# Patient Record
Sex: MALE | Race: BLACK OR AFRICAN AMERICAN | URBAN - METROPOLITAN AREA
[De-identification: names, ages, dates, MRNs, and addresses within clinical notes are randomized per-mention and may not be internally consistent; named-entity substitution may affect disease eponyms.]

---

## 2017-01-03 ENCOUNTER — MEDICAL CORRESPONDENCE (OUTPATIENT)
Dept: TRANSPLANT | Facility: CLINIC | Age: 62
End: 2017-01-03

## 2017-02-14 ENCOUNTER — TELEPHONE (OUTPATIENT)
Dept: TRANSPLANT | Facility: CLINIC | Age: 62
End: 2017-02-14

## 2017-02-14 NOTE — TELEPHONE ENCOUNTER
Received weight record from dialysis - weight is 278 on 12/28/2016 which equals to a BMI of 39. Pt has lost 8 pounds since he was here. Called pt today and spoke to him while he was eating potato chips. I reviewed what his weight is now and our criteria of BMI of 35 for kidney transplant with a maximum weight of 250 pounds. Pt acknowledged he knows this and has my last letter which explains this. Pt stating he is losing weight on his own and will check with his dialysis unit for weight loss program availability. I explained that at this time, no further appointments will be made for his transplant evaluation until his weight is 250 pounds. Pt expressed good understanding of all and was in good agreement with the plan.

## 2017-02-20 ENCOUNTER — TELEPHONE (OUTPATIENT)
Dept: TRANSPLANT | Facility: CLINIC | Age: 62
End: 2017-02-20

## 2017-02-20 DIAGNOSIS — N18.6 ESRD (END STAGE RENAL DISEASE) (H): Primary | ICD-10-CM

## 2017-02-20 NOTE — TELEPHONE ENCOUNTER
"Per Dr. Hendrix's recommendation, I called pt today and explained we recommend he RTC\"s here to further discuss kidney transplant requirement for weight loss. Offered a follow up appt with Fanny SWIFT for this - pt agreeable to attend a f/u appt with Fanny. I explained a  will call him soon to set up appt - pt expressed good understanding of all and was in good agreement with the plan.     Order into [facility] today for RTC appt with Fanny BRAVO RD - routed to .      "

## 2017-02-20 NOTE — Clinical Note
Please see order for nutritionist appt to further discuss weight loss. Please schedule with Fanny BRAVO RD who has already seen pt last summer. I have spoken with Fanny about pt's situation and she is agreeing to see pt. Pt is on dialysis. Thanks Eleonora

## 2017-03-02 ENCOUNTER — ALLIED HEALTH/NURSE VISIT (OUTPATIENT)
Dept: TRANSPLANT | Facility: CLINIC | Age: 62
End: 2017-03-02
Attending: DIETITIAN, REGISTERED
Payer: MEDICARE

## 2017-03-02 VITALS — BODY MASS INDEX: 39.61 KG/M2 | WEIGHT: 284 LBS

## 2017-03-02 DIAGNOSIS — Z76.82 ORGAN TRANSPLANT CANDIDATE: Primary | ICD-10-CM

## 2017-03-02 NOTE — PROGRESS NOTES
"OUTPATIENT NUTRITION REASSESSMENT NOTE    REASON FOR REASSESSMENT  Marko Jack is a 62 year old male seen by the dietitian for weight loss progress while being evaluated for kidney txp referred by Dr. Hendrix  Pt accompanied by self  F/U ON PREVIOUS GOALS  1. Weight loss to goal wt <250 lbs (BMI <35) for kidney txp   Update: Pt has not met this goal. See below for more details.     2. Adequate protein per above (109 g/day)  Update: Pt has not met this goal. See below for current diet recall.     PREVIOUS NUTRITION DX:  Obesity r/t excessive energy intake and inadequate physical activity AEB BMI>30.   Update/Evaluation: Ongoing    INTERVAL HISTORY  Pt continues to reside at [facility]. They used to serve him 2 meals/day (too salty) but now he cooks and is responsible for all of his own meals. He does not eat out often, but plans to go to [employer] tomorrow since he hasn't gone there for a while. He is walking the halls of his apartment 30 minutes per day.     B: 2 eggs, occasional mac   L: salami and cheese s/w with chips; Cub frozen pizza (~1/2 pizza)  D: 1/2 frozen pizza; boiled turkey tails or polish sausage with corn bread and greens  Sn: none  Ana: 16 oz 2% milk, 16 oz regular Pepsi or Tahitian Treat or other soda, coffee with sugar and cream, 8 oz juice     ANTHROPOMETRICS  Height: 71\"  Weight: 284 lbs   BMI: 39.61  IBW: 172 lbs   % IBW: 165%  Weight History: Wt has remained stable since 5/2016. Wt during that visit was 286 lbs. However, per chart review, note that dry weight at dialysis on 12/28/16 was 278 lbs.   Adjusted/dosing weight: 200 lbs/91 kg    NUTRITION DIAGNOSIS:  Obesity related to excessive energy intake and inadequate physical activity as evidenced by BMI>30.     INTERVENTIONS  1. Encouraged continued activity  2. Eliminate all juice and soda, drink only water for at least 1 month. Track dialysis weights to see if he has made any progress.   3. Instead of pizza, cook more meat and veggies. Pt " agreeable to this.     Goals  1. Weight loss <284 lbs  2. Eliminate calorie-containing beverages     Follow up/Monitoring  PRN  Patient Understanding: Good  Expected Compliance: Good  Follow-Up Plans: PRN  Provided pt with contact info.   Time spent with patient: 15 minutes.  Nayely Martinez RD, LD

## 2017-03-02 NOTE — MR AVS SNAPSHOT
"              After Visit Summary   3/2/2017    Marko Jack    MRN: [id number]           Patient Information     Date Of Birth          1955        Visit Information        Provider Department      3/2/2017 2:30 PM Nayely Martinez RD [facility] Solid Organ Transplant        Today's Diagnoses     Organ transplant candidate    -  1       Follow-ups after your visit        Who to contact     If you have questions or need follow up information about today's clinic visit or your schedule please contact [facility] TRANSPL[facility] directly at [phone].  Normal or non-critical lab and imaging results will be communicated to you by Trait[employer], letter or phone within 4 business days after the clinic has received the results. If you do not hear from us within 7 days, please contact the clinic through [employer] or phone. If you have a critical or abnormal lab result, we will notify you by phone as soon as possible.  Submit refill requests through [employer] or call your pharmacy and they will forward the refill request to us. Please allow 3 business days for your refill to be completed.          Additional Information About Your Visit        MyChart Information     Vendly lets you send messages to your doctor, view your test results, renew your prescriptions, schedule appointments and more. To sign up, go to [email or web address] . Click on \"Log in\" on the left side of the screen, which will take you to the Welcome page. Then click on \"Sign up Now\" on the right side of the page.     You will be asked to enter the access code listed below, as well as some personal information. Please follow the directions to create your username and password.     Your access code is: [email or web address]  Expires: 2017  6:31 AM     Your access code will  in 90 days. If you need help or a new code, please call your [place] clinic or [phone].        Care EveryWhere ID     This is your Care EveryWhere ID. This could be used " by other organizations to access your [place] medical records  [id number]        Your Vitals Were     BMI (Body Mass Index)                   39.61 kg/m2            Blood Pressure from Last 3 Encounters:   05/26/16 100/65    Weight from Last 3 Encounters:   03/02/17 128.8 kg (284 lb)   05/26/16 130 kg (286 lb 8 oz)              Today, you had the following     No orders found for display       Primary Care Provider Office Phone # Fax #    Tea Humphrey [phone]       [facility] [address] [address]        Thank you!     Thank you for choosing [facility]ANSPL[facility]  for your care. Our goal is always to provide you with excellent care. Hearing back from our patients is one way we can continue to improve our services. Please take a few minutes to complete the written survey that you may receive in the mail after your visit with us. Thank you!             Your Updated Medication List - Protect others around you: Learn how to safely use, store and throw away your medicines at www.disposemymeds.org.          This list is accurate as of: 3/2/17  3:39 PM.  Always use your most recent med list.                   Brand Name Dispense Instructions for use    acetaminophen 325 MG tablet    TYLENOL     Take 325 mg by mouth twice a day as needed for Pain.       allopurinol 100 MG tablet    ZYLOPRIM     Take 50 mg by mouth Once Daily. Take 50 mg by mouth once daily.       aspirin 81 MG tablet          BD ULTRA-FINE 29G X 12.7MM   Generic drug:  insulin pen needle      by Misc.(Non-Drug; Combo Route) route as directed.       Calcium Acetate (Phos Binder) 667 MG Caps      Take 1,334 mg by mouth three times a day with meals.       fish oil-omega-3 fatty acids 1000 MG capsule      Take 1,500 mg by mouth Twice a Day.       FLEXERIL 10 MG tablet   Generic drug:  cyclobenzaprine      Take 1 Tab by mouth twice a day as needed for Muscle spasm.       KAYEXALATE Powd       Take as needed only for days not going to dialysis.       lactulose 10 GM/15ML solution    CHRONULAC     15 mLs       lanthanum 500 MG chewable tablet    FOSRENOL     Patient unsure if taking       LANTUS SOLOSTAR 100 UNIT/ML injection   Generic drug:  insulin glargine      Inject 44 Units under the skin every morning.       midodrine HCl 10 MG Tabs      Patientt taking 1 tablet daily on dialysis days       NEPHROCAPS PO          nitroglycerin 0.4 MG sublingual tablet    NITROSTAT     1 Tab by Sublingual route every 5 (five) minutes as needed.       NovoLOG FLEXPEN 100 UNIT/ML injection   Generic drug:  insulin aspart      Inject 10 Units under the skin three times a day with meals.       rosuvastatin 40 MG tablet    CRESTOR     Take 1 Tab by mouth at bedtime.       senna 8.6 MG tablet    SENOKOT     Take 2 Tabs by mouth once a day as needed.       warfarin 5 MG tablet    COUMADIN     Take 7.5 mg by mouth Once Daily.

## 2017-03-15 ENCOUNTER — COMMITTEE REVIEW (OUTPATIENT)
Dept: TRANSPLANT | Facility: CLINIC | Age: 62
End: 2017-03-15

## 2017-03-15 NOTE — LETTER
March 15, 2017      Marko Jack  [address]   [place] [address]        Dear Mr. Jack,   The purpose of this letter is to let you know that on 03/15/2017 the [facility] Multi-Disciplinary Selection Team reviewed the results of your transplant evaluation.  Based on the results of your evaluation and the selection criteria used by our program , the decision was made to not list you on the kidney transplant list.  This is because your still do not meet weight criteria for transplant. Please feel free to call our Center again as soon as your body mass index is at 35 which is a weight of 250 pounds.   Important things you should know:    If you would like to discuss the decision, or if your medical status changes you may schedule a return visits with your doctor by calling [phone] and asking to speak to your transplant coordinator.    We recommend that you continue to follow up with your primary care doctor in order to manage your health concerns.  Enclosed is a letter from [employer] which describes the services offered to patients by [facility] and the [facility].  Thank you for allowing us to participate in your care.  We wish you well.    Sincerely,  Kidney Transplant Team  Enclosure:  [employer] Letter

## 2017-03-15 NOTE — COMMITTEE REVIEW
Abdominal Patient Discussion Note Transplant Coordinator: Eleonora Ricci  Transplant Surgeon: Dr. Riley Hendrix        Referring Physician: Jayson Bates    Committee Review Members:  Dietitian, Alea Matias RD   Pharmacist Marilou Romeo    - Clinical Jia Contreras, MSW, Kiley Solo, MSW   Transplant Shahida Villegas, EMANUEL, Haylie Duque, LPN, Eleonora Ricci RN, Valeria Alberto RN, Luis Angel Hendrix MD, Riley Hendrix MD       Additional Discussion Notes and Findings: Reviewed BMI and lack of weight loss since evaluation was initiated. Decision made to decline pt as a candidate due to BMI > 35 without significant weight loss over the last 6 months. Determination made that evaluation to be closed now, patient can be re-referred in the future when BMI is 35 or less. 250 pounds = BMI of 35 for pt.     Called pt today and spoke with him - reviewed outcome of the Selection Committee. Instructed pt to call us again when he is at a BMI of 35. Explained I will send a letter regarding this outcome. Pt expressed good understanding of all and was in good agreement with the plan.     Generated letter in EPIC - routed to  for mailing.

## 2020-06-18 ENCOUNTER — RECORDS - HEALTHEAST (OUTPATIENT)
Dept: LAB | Facility: CLINIC | Age: 65
End: 2020-06-18

## 2020-06-18 LAB
ANION GAP SERPL CALCULATED.3IONS-SCNC: 9 MMOL/L (ref 5–18)
BASOPHILS # BLD AUTO: 0 THOU/UL (ref 0–0.2)
BASOPHILS NFR BLD AUTO: 0 % (ref 0–2)
BUN SERPL-MCNC: 19 MG/DL (ref 8–22)
CALCIUM SERPL-MCNC: 10.4 MG/DL (ref 8.5–10.5)
CHLORIDE BLD-SCNC: 101 MMOL/L (ref 98–107)
CO2 SERPL-SCNC: 27 MMOL/L (ref 22–31)
CREAT SERPL-MCNC: 4.24 MG/DL (ref 0.7–1.3)
EOSINOPHIL # BLD AUTO: 0.2 THOU/UL (ref 0–0.4)
EOSINOPHIL NFR BLD AUTO: 3 % (ref 0–6)
ERYTHROCYTE [DISTWIDTH] IN BLOOD BY AUTOMATED COUNT: 16.9 % (ref 11–14.5)
GFR SERPL CREATININE-BSD FRML MDRD: 14 ML/MIN/1.73M2
GLUCOSE BLD-MCNC: 106 MG/DL (ref 70–125)
HCT VFR BLD AUTO: 27.9 % (ref 40–54)
HGB BLD-MCNC: 7.7 G/DL (ref 14–18)
LYMPHOCYTES # BLD AUTO: 0.9 THOU/UL (ref 0.8–4.4)
LYMPHOCYTES NFR BLD AUTO: 10 % (ref 20–40)
MCH RBC QN AUTO: 24.8 PG (ref 27–34)
MCHC RBC AUTO-ENTMCNC: 27.6 G/DL (ref 32–36)
MCV RBC AUTO: 90 FL (ref 80–100)
MONOCYTES # BLD AUTO: 0.9 THOU/UL (ref 0–0.9)
MONOCYTES NFR BLD AUTO: 11 % (ref 2–10)
NEUTROPHILS # BLD AUTO: 6.5 THOU/UL (ref 2–7.7)
NEUTROPHILS NFR BLD AUTO: 76 % (ref 50–70)
PLATELET # BLD AUTO: 299 THOU/UL (ref 140–440)
PMV BLD AUTO: 10 FL (ref 8.5–12.5)
POTASSIUM BLD-SCNC: 4.7 MMOL/L (ref 3.5–5)
RBC # BLD AUTO: 3.1 MILL/UL (ref 4.4–6.2)
SODIUM SERPL-SCNC: 137 MMOL/L (ref 136–145)
WBC: 8.6 THOU/UL (ref 4–11)

## 2020-06-25 ENCOUNTER — RECORDS - HEALTHEAST (OUTPATIENT)
Dept: LAB | Facility: CLINIC | Age: 65
End: 2020-06-25

## 2020-06-26 LAB
ALBUMIN SERPL-MCNC: 2.2 G/DL (ref 3.5–5)
ALP SERPL-CCNC: 106 U/L (ref 45–120)
ALT SERPL W P-5'-P-CCNC: 9 U/L (ref 0–45)
ANION GAP SERPL CALCULATED.3IONS-SCNC: 8 MMOL/L (ref 5–18)
AST SERPL W P-5'-P-CCNC: 9 U/L (ref 0–40)
BASOPHILS # BLD AUTO: 0 THOU/UL (ref 0–0.2)
BASOPHILS NFR BLD AUTO: 0 % (ref 0–2)
BILIRUB SERPL-MCNC: 0.2 MG/DL (ref 0–1)
BUN SERPL-MCNC: 39 MG/DL (ref 8–22)
CALCIUM SERPL-MCNC: 10.6 MG/DL (ref 8.5–10.5)
CHLORIDE BLD-SCNC: 102 MMOL/L (ref 98–107)
CO2 SERPL-SCNC: 26 MMOL/L (ref 22–31)
CREAT SERPL-MCNC: 6.4 MG/DL (ref 0.7–1.3)
EOSINOPHIL # BLD AUTO: 0.3 THOU/UL (ref 0–0.4)
EOSINOPHIL NFR BLD AUTO: 5 % (ref 0–6)
ERYTHROCYTE [DISTWIDTH] IN BLOOD BY AUTOMATED COUNT: 17 % (ref 11–14.5)
GFR SERPL CREATININE-BSD FRML MDRD: 9 ML/MIN/1.73M2
GLUCOSE BLD-MCNC: 134 MG/DL (ref 70–125)
HCT VFR BLD AUTO: 25.2 % (ref 40–54)
HGB BLD-MCNC: 7.2 G/DL (ref 14–18)
INR PPP: 1.01 (ref 0.9–1.1)
LYMPHOCYTES # BLD AUTO: 0.6 THOU/UL (ref 0.8–4.4)
LYMPHOCYTES NFR BLD AUTO: 11 % (ref 20–40)
MCH RBC QN AUTO: 25.2 PG (ref 27–34)
MCHC RBC AUTO-ENTMCNC: 28.6 G/DL (ref 32–36)
MCV RBC AUTO: 88 FL (ref 80–100)
MONOCYTES # BLD AUTO: 0.7 THOU/UL (ref 0–0.9)
MONOCYTES NFR BLD AUTO: 13 % (ref 2–10)
NEUTROPHILS # BLD AUTO: 3.6 THOU/UL (ref 2–7.7)
NEUTROPHILS NFR BLD AUTO: 70 % (ref 50–70)
PLATELET # BLD AUTO: 178 THOU/UL (ref 140–440)
PMV BLD AUTO: 10.2 FL (ref 8.5–12.5)
POTASSIUM BLD-SCNC: 5.4 MMOL/L (ref 3.5–5)
PROT SERPL-MCNC: 6.9 G/DL (ref 6–8)
RBC # BLD AUTO: 2.86 MILL/UL (ref 4.4–6.2)
SODIUM SERPL-SCNC: 136 MMOL/L (ref 136–145)
WBC: 5.1 THOU/UL (ref 4–11)